# Patient Record
Sex: FEMALE | Race: WHITE | URBAN - METROPOLITAN AREA
[De-identification: names, ages, dates, MRNs, and addresses within clinical notes are randomized per-mention and may not be internally consistent; named-entity substitution may affect disease eponyms.]

---

## 2021-10-11 ENCOUNTER — OFFICE VISIT (OUTPATIENT)
Dept: URBAN - METROPOLITAN AREA CLINIC 33 | Facility: CLINIC | Age: 81
End: 2021-10-11
Payer: MEDICARE

## 2021-10-11 DIAGNOSIS — H18.451 NODULAR CORNEAL DEGENERATION, RIGHT EYE: Primary | ICD-10-CM

## 2021-10-11 PROCEDURE — 92025 CPTRIZED CORNEAL TOPOGRAPHY: CPT | Performed by: OPHTHALMOLOGY

## 2021-10-11 PROCEDURE — 99203 OFFICE O/P NEW LOW 30 MIN: CPT | Performed by: OPHTHALMOLOGY

## 2021-10-11 ASSESSMENT — INTRAOCULAR PRESSURE
OS: 13
OD: 14

## 2021-10-11 NOTE — IMPRESSION/PLAN
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 Condition: quality of life issue. Symptoms: may improve with surgery. Vision: vision affected. Plan: Discussed diagnosis in detail with patient. Discussed risks of progression. Discussed treatment options with patient. Discussed aggressive lubrication vs surgery. R/B/A of surgery discussed and understood by patient and patient defers surgery at this time.